# Patient Record
Sex: MALE | Race: WHITE
[De-identification: names, ages, dates, MRNs, and addresses within clinical notes are randomized per-mention and may not be internally consistent; named-entity substitution may affect disease eponyms.]

---

## 2019-12-16 ENCOUNTER — HOSPITAL ENCOUNTER (OUTPATIENT)
Dept: HOSPITAL 33 - SDC | Age: 49
Discharge: HOME | End: 2019-12-16
Attending: SURGERY
Payer: COMMERCIAL

## 2019-12-16 VITALS — SYSTOLIC BLOOD PRESSURE: 134 MMHG | HEART RATE: 67 BPM | DIASTOLIC BLOOD PRESSURE: 87 MMHG | OXYGEN SATURATION: 100 %

## 2019-12-16 DIAGNOSIS — K63.89: ICD-10-CM

## 2019-12-16 DIAGNOSIS — Z80.42: ICD-10-CM

## 2019-12-16 DIAGNOSIS — K57.30: ICD-10-CM

## 2019-12-16 DIAGNOSIS — E55.9: ICD-10-CM

## 2019-12-16 DIAGNOSIS — Z12.5: ICD-10-CM

## 2019-12-16 DIAGNOSIS — Z12.11: Primary | ICD-10-CM

## 2019-12-16 DIAGNOSIS — E29.1: ICD-10-CM

## 2019-12-16 DIAGNOSIS — I10: ICD-10-CM

## 2019-12-16 DIAGNOSIS — Z79.899: ICD-10-CM

## 2019-12-16 LAB
ALBUMIN SERPL-MCNC: 4.3 G/DL (ref 3.5–5)
ALP SERPL-CCNC: 62 U/L (ref 38–126)
ALT SERPL-CCNC: 83 U/L (ref 0–50)
ANION GAP SERPL CALC-SCNC: 10.7 MEQ/L (ref 5–15)
AST SERPL QL: 39 U/L (ref 17–59)
BILIRUB BLD-MCNC: 1 MG/DL (ref 0.2–1.3)
BUN SERPL-MCNC: 12 MG/DL (ref 9–20)
CALCIUM SPEC-MCNC: 8.8 MG/DL (ref 8.4–10.2)
CHLORIDE SERPL-SCNC: 110 MMOL/L (ref 98–107)
CHOLESTANOL SERPL-MCNC: 193 MG/DL (ref 50–200)
CO2 SERPL-SCNC: 25 MMOL/L (ref 22–30)
CREAT SERPL-MCNC: 0.96 MG/DL (ref 0.66–1.25)
GLUCOSE SERPL-MCNC: 101 MG/DL (ref 74–106)
HCT VFR BLD AUTO: 46.1 % (ref 42–50)
HDLC SERPL-MCNC: 36 MG/DL (ref 40–60)
HGB BLD-MCNC: 15.8 GM/DL (ref 12.5–18)
LDLC SERPL DIRECT ASSAY-MCNC: 120 MG/DL (ref 30–100)
MCH RBC QN AUTO: 30.2 PG (ref 26–32)
MCHC RBC AUTO-ENTMCNC: 34.3 G/DL (ref 32–36)
PLATELET # BLD AUTO: 174 K/MM3 (ref 150–450)
POTASSIUM SERPLBLD-SCNC: 3.8 MMOL/L (ref 3.5–5.1)
PROT SERPL-MCNC: 7.1 G/DL (ref 6.3–8.2)
PSA SERPL-MCNC: 1.21 NG/ML (ref 0–4)
RBC # BLD AUTO: 5.23 M/MM3 (ref 4.1–5.6)
SODIUM SERPL-SCNC: 143 MMOL/L (ref 137–145)
TRIGL SERPL-MCNC: 163 MG/DL (ref 30–150)
WBC # BLD AUTO: 4.6 K/MM3 (ref 4–10.5)

## 2019-12-16 PROCEDURE — 88305 TISSUE EXAM BY PATHOLOGIST: CPT

## 2019-12-16 PROCEDURE — 45380 COLONOSCOPY AND BIOPSY: CPT

## 2019-12-16 PROCEDURE — 84443 ASSAY THYROID STIM HORMONE: CPT

## 2019-12-16 PROCEDURE — 36415 COLL VENOUS BLD VENIPUNCTURE: CPT

## 2019-12-16 PROCEDURE — 82306 VITAMIN D 25 HYDROXY: CPT

## 2019-12-16 PROCEDURE — 80061 LIPID PANEL: CPT

## 2019-12-16 PROCEDURE — 80053 COMPREHEN METABOLIC PANEL: CPT

## 2019-12-16 PROCEDURE — 83721 ASSAY OF BLOOD LIPOPROTEIN: CPT

## 2019-12-16 PROCEDURE — 45384 COLONOSCOPY W/LESION REMOVAL: CPT

## 2019-12-16 PROCEDURE — 84403 ASSAY OF TOTAL TESTOSTERONE: CPT

## 2019-12-16 PROCEDURE — 85027 COMPLETE CBC AUTOMATED: CPT

## 2019-12-16 NOTE — HP
DATE OF SURGERY:   12/16/2019



HISTORY OF PRESENT ILLNESS:   The patient is a 49 year-old gentleman who will be 50 in the 
next few months, the first part of the year. He is in need of screening colonoscopy.  



PAST MEDICAL HISTORY:  Hypertension.  No chronic illnesses.  



PAST SURGICAL HISTORY: Sinus surgery. He had lipoma removed in the past. 



MEDICATIONS:  Flonase, Latanoprost, testosterone cypionate, losartan and lisinopril for 
some blood pressure normalizing. 



ALLERGIES:  NKDA.

  

FAMILY HISTORY:  Prostate cancer in the father. Negative for colon cancer.  



SOCIAL HISTORY:  Denies smoking. He does have 12 to 24 beers a week. 



REVIEW OF SYSTEMS:  Fourteen systems reviewed. No bloody stools. No change in bowel 
movements. No abdominal pain. He is just in need of screening colonoscopy as he is going 
to be turning 50 in three to four months. No chest pain or palpitations other systems 
negative or noncontributory as above and per preadmission questionnaire. 



PHYSICAL EXAMINATION:  

GENERAL:  No acute distress.

HEENT: Sclerae nonicteric.

NECK:  No JVD.

CHEST: Equal excursion, nonlabored breathing. 

CVS:  Regular rate and rhythm. 

ABDOMEN:  Soft, nondistended.  

EXTREMITIES:  No edema.

NEURO:  Alert, oriented, moving extremities symmetrically. 



IMPRESSION:  Need for screening colonoscopy, family history of prostate cancer. I feel he 
is a candidate for screening colonoscopy as he will be 50 in three to four months. Risks 
and benefits explained in detail including but not limited to bleeding or infection, risk 
of bowel injury or perforation possibly requiring open procedure, small risk of missed or 
nondiagnosis or incomplete exam possibly requiring barium enema, other studies or 
procedures, general risk of anesthesia or sedation, risk of bowel prep. He understands and 
agrees to the planned procedure, will add on for outpatient colonoscopy to schedule under 
MAC anesthesia.

## 2019-12-16 NOTE — OP
SURGERY DATE/TIME:   12/16/2019  1022



PREOPERATIVE DIAGNOSES: 

1) Screening colonoscopy. 

2) Family history of prostate cancer. 



POSTOPERATIVE DIAGNOSES: 

1) Mild diverticulosis. 

2) Fair bowel prep. 

3) Very vague, very small raised area rectum versus hyperplasia of mucosa versus normal 
variation of mucosa rectum. 

4) Patchy area erythema left colon versus prep irritation, path pending. 



PROCEDURES:  

1) Colonoscopy to cecum with cold biopsy, patchy area of erythema left colon to evaluate 
for microscopic colitis versus prep irritation. 

2) Hot biopsy removal of very small, 2 mm, raised rectal area to evaluate for hyperplastic 
lesion versus early polyp versus normal variation of rectal mucosa. 



SURGEON:       Dr. Frank Orantes.



ASSISTANT:   Kelton Cook, Medical Student III. 

ANESTHESIA:    MAC.



ESTIMATED BLOOD LOSS:    Minimal.  



INDICATIONS:  As noted above. Risks and benefits explained in detail but not limited to 
and consent obtained. 



DESCRIPTION OF PROCEDURE AND FINDINGS: The patient is taken to the operating room. MAC 
anesthesia introduced. After official time out and no disagreement with planned procedure, 
digital rectal exam did not reveal any rectal masses. Video colonoscope inserted and 
passed up through the slightly tortuous sigmoid, descending, transverse and ascending 
colon. With external pressure the scope was able to be passed around to the cecum.  
Appendiceal orifice and valve well visualized. Prep overall was fair with a little bit of 
liquidy semi-solid stool throughout the colon just slightly limiting the exam for tiny 
lesions. There were no signs of any large polyps, masses or obstructing lesions. This area 
of the colon was suction irrigated as clear as possible. The scope is slowly and carefully 
withdrawn over the next 10 minutes. Biopsy of small patchy area of erythema versus prep 
irritation in the left colon. Good hemostasis noted. Cold biopsy accomplished. He did have 
some mild diverticulosis. Scope pulled back in the rectum. He did not seem to have 
significant hemorrhoidal disease to speak of. He did have a very small, 1.2 to 2 mm, 
raised area whether early polyp, early hyperplastic lesion versus early true polyp versus 
just variation of his rectal mucosa was removed with hot biopsy forceps with brief bursts 
of cautery. Good hemostasis noted. The scope is withdrawn. The patient tolerated the 
procedure well. Findings discussed with the family out in the waiting area.

## 2020-08-17 ENCOUNTER — HOSPITAL ENCOUNTER (OUTPATIENT)
Dept: HOSPITAL 33 - SDC | Age: 50
Discharge: HOME | End: 2020-08-17
Attending: SURGERY
Payer: COMMERCIAL

## 2020-08-17 VITALS — SYSTOLIC BLOOD PRESSURE: 137 MMHG | DIASTOLIC BLOOD PRESSURE: 71 MMHG | OXYGEN SATURATION: 99 % | HEART RATE: 69 BPM

## 2020-08-17 DIAGNOSIS — K22.2: Primary | ICD-10-CM

## 2020-08-17 DIAGNOSIS — I10: ICD-10-CM

## 2020-08-17 DIAGNOSIS — Z79.899: ICD-10-CM

## 2020-08-17 DIAGNOSIS — K29.70: ICD-10-CM

## 2020-08-17 DIAGNOSIS — K20.9: ICD-10-CM

## 2020-08-17 DIAGNOSIS — K22.4: ICD-10-CM

## 2020-08-17 NOTE — HP
DATE OF SURGERY:  08/17/2020 



HISTORY OF PRESENT ILLNESS:  The patient is a 50 year old who has problems even laying 
down flat, has problems with mucous in the back of his throat. He has had sleep studies. 
It does not help with the BiPAP. He has dysphagia upper esophagus area whether this is a 
combination of some upper airway, sinus or other etiology is unclear but he is in need of 
upper endoscopy, possible biopsy, possible dilatation pending operative findings. 



PAST MEDICAL HISTORY:  Hypertension. 



PAST SURGICAL HISTORY: Sinus surgery years ago when he was younger. Colonoscopy in the 
past. 



MEDICATIONS:  Losartan, latanoprost, testosterone. 



ALLERGIES:  NKDA.

  

FAMILY HISTORY: Prostate cancer, thyroid cancer.  



SOCIAL HISTORY:  He used oral tobacco few decades ago when he was younger. No alcohol 
abuse. 



REVIEW OF SYSTEMS:  As viewed per hospital admission, pertinent for as noted above. He has 
increased eye pressure for which he is on latanoprost drops for. 



PHYSICAL EXAMINATION:  

GENERAL:  No acute distress.

HEENT: Sclerae nonicteric.

NECK:  No JVD.

CHEST: Equal excursion. 

CVS:  Regular rate and rhythm. 

ABDOMEN:  Nondistended.

EXTREMITIES:  No gross edema.

NEURO:  Alert, oriented, moving extremities symmetrically.  

PSYCH: Appropriate mood and affect.  



IMPRESSION:  Question of dysphagia or what sounds like possibly some upper airway mucous 
drainage. I feel he needs assessment with EGD, possible biopsy possible dilatation. Risks 
and benefits explained in detail including but not limited to bleeding or infection, risk 
of bowel injury or perforation possibly requiring open procedure, possibility may not find 
narrowed area to dilate or if an area narrowed and dilated may fail to improve his 
symptoms particularly if there is more drainage further up. He also understands 
possibility dilatation may be performed and does improve that it may need repeated again 
down the road.  He understands if we find any obvious mass or other lesion could consider 
biopsy at the time. General risk of anesthesia or sedation but not limited to. Risk of 
bowel injury or perforation possibly requiring other procedures, will proceed with EGD 
possible biopsy possible dilatation when OR time available.

## 2020-08-18 NOTE — OP
SURGERY DATE/TIME:   08/17/2020 1015



PREOPERATIVE DIAGNOSIS:  Dysphagia. 



POSTOPERATIVE DIAGNOSES: 

1) Mild gastritis. 

2) Short segment distal esophagitis. 

3) Proximal esophageal narrowing and spasm requiring dilatation.  



PROCEDURES: 

1) EGD with cold biopsy of antrum for Helicobacter pylori. 

2) Cold biopsy distal esophagus. 

3) Proximal esophageal balloon dilation (size 20 balloon).  



SURGEON:        Dr. Frank Orantes.



ANESTHESIA:    MAC. 



ESTIMATED BLOOD LOSS:    Minimal.    



INDICATIONS:  As noted above. Risks and benefits explained in detail and not limited to 
and consent obtained.     

     

DESCRIPTION OF PROCEDURE AND FINDINGS: The patient is taken to the endoscopy room. MAC 
anesthesia introduced. After official time out and no disagreement with planned procedure, 
a bite block positioned. Video gastroscope passed down the oropharynx. There was some 
proximal esophageal narrowing and spasm in the upper esophagus. The scope was able to be 
pass through here but he is having symptoms here. It was felt this warranted consideration 
of dilatation procedure. There was no obvious mass or lesion to biopsy. The scope was 
passed down through the gastroesophageal junction to the patent pylorus to the junction of 
the second and third portion of the duodenum. Second, third and first portion of the 
duodenum grossly unremarkable. No signs of any inflammation. Scope pulled back into the 
stomach. There was some evidence of some mild gastritis. Some cold biopsies were taken to 
evaluate for Helicobacter pylori. Good hemostasis noted. There were no signs of any 
obvious ulcers. On retroflex gastroesophageal junction fairly snug. No gross signs of any 
large hiatal hernia endoscopically. Scope straightened. Gastroesophageal junction 40 cm. 
There was a short segment of some distal gastroesophagitis. Cold biopsy is taken to 
evaluate for esophagitis versus degree of Arias's. The scope is withdrawn. The remainder 
of the esophagus grossly unremarkable up to the proximal esophageal narrowed and spasm 
area. It was felt this warranted a trial of dilatation. Scope passed back down in the 
stomach. A 20 balloon catheter tip was advanced under direct vision of the camera in the 
open area of the stomach and then pulled back up to the proximal esophagus area. It was 
gradually inflated first stage 45 seconds, second stage size 19 for 45 seconds, final 
stage size 20 balloon dilator for 2 minutes. Balloon catheter is then carefully 
decompressed and withdrawn. The scope much more easily passed through this. The scope 
passed back down in the stomach and gradually withdrawn. Again the area that had been 
dilated seemed to be a little bit more widely patent. There was no gross evidence of any 
full thickness injury secondary to dilatation. The scope is withdrawn. The patient 
tolerated the procedure well. Findings discussed with his wife out in the waiting area.